# Patient Record
(demographics unavailable — no encounter records)

---

## 2025-04-16 NOTE — HISTORY OF PRESENT ILLNESS
[FreeTextEntry1] : Ms. MIGUEL CRANDALL is a 40 year old female with a past medical history of GERD, COVID (1/2021) presents for follow up. Patient got  last year. She became pregnant and delivered 3 weeks ago.  However thyroid medication did not have to be changed.  She is still on levothyroxine 50 mcg daily.  She is currently breast-feeding.  She feels well overall.

## 2025-04-16 NOTE — ASSESSMENT
[FreeTextEntry1] : 40 year old female with a past medical history of GERD, COVID (1/2021) presents for abnormal thyroid tests  1. Hypothyroidism  Patient has Hashimoto's Thyroiditis Will check levels today Cont Synthroid 50 mcg QD  2. Thyroid Nodules Explained thyroid nodules and the risk of cancer, FNA and management US shows a cyst and no repeat US is warranted currently  Return to clinic in a year